# Patient Record
Sex: MALE | Race: WHITE
[De-identification: names, ages, dates, MRNs, and addresses within clinical notes are randomized per-mention and may not be internally consistent; named-entity substitution may affect disease eponyms.]

---

## 2018-06-30 ENCOUNTER — HOSPITAL ENCOUNTER (EMERGENCY)
Dept: HOSPITAL 42 - ED | Age: 23
Discharge: HOME | End: 2018-06-30
Payer: MEDICAID

## 2018-06-30 VITALS
HEART RATE: 92 BPM | OXYGEN SATURATION: 99 % | RESPIRATION RATE: 18 BRPM | SYSTOLIC BLOOD PRESSURE: 137 MMHG | DIASTOLIC BLOOD PRESSURE: 75 MMHG | TEMPERATURE: 98.1 F

## 2018-06-30 VITALS — BODY MASS INDEX: 21.2 KG/M2

## 2018-06-30 DIAGNOSIS — Z04.1: Primary | ICD-10-CM
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
## 2018-08-05 ENCOUNTER — HOSPITAL ENCOUNTER (EMERGENCY)
Dept: HOSPITAL 42 - ED | Age: 23
Discharge: HOME | End: 2018-08-05
Payer: MEDICAID

## 2018-08-05 VITALS — OXYGEN SATURATION: 100 % | TEMPERATURE: 98.3 F | RESPIRATION RATE: 18 BRPM

## 2018-08-05 VITALS — SYSTOLIC BLOOD PRESSURE: 127 MMHG | DIASTOLIC BLOOD PRESSURE: 88 MMHG | HEART RATE: 52 BPM

## 2018-08-05 VITALS — BODY MASS INDEX: 21.2 KG/M2

## 2018-08-05 DIAGNOSIS — R93.5: ICD-10-CM

## 2018-08-05 DIAGNOSIS — F17.210: ICD-10-CM

## 2018-08-05 DIAGNOSIS — R10.9: ICD-10-CM

## 2018-08-05 DIAGNOSIS — N20.0: Primary | ICD-10-CM

## 2018-08-05 LAB
ALBUMIN SERPL-MCNC: 4.7 G/DL (ref 3–4.8)
ALBUMIN/GLOB SERPL: 1.5 {RATIO} (ref 1.1–1.8)
ALT SERPL-CCNC: 28 U/L (ref 7–56)
APPEARANCE UR: CLEAR
AST SERPL-CCNC: 26 U/L (ref 17–59)
BACTERIA #/AREA URNS HPF: (no result) /[HPF]
BASOPHILS # BLD AUTO: 0.02 K/MM3 (ref 0–2)
BASOPHILS NFR BLD: 0.3 % (ref 0–3)
BILIRUB UR-MCNC: NEGATIVE MG/DL
BUN SERPL-MCNC: 20 MG/DL (ref 7–21)
CALCIUM SERPL-MCNC: 10.1 MG/DL (ref 8.4–10.5)
COLOR UR: YELLOW
EOSINOPHIL # BLD: 0.1 10*3/UL (ref 0–0.7)
EOSINOPHIL NFR BLD: 1.3 % (ref 1.5–5)
ERYTHROCYTE [DISTWIDTH] IN BLOOD BY AUTOMATED COUNT: 12.5 % (ref 11.5–14.5)
GFR NON-AFRICAN AMERICAN: > 60
GLUCOSE UR STRIP-MCNC: NEGATIVE MG/DL
GRANULOCYTES # BLD: 4.03 10*3/UL (ref 1.4–6.5)
GRANULOCYTES NFR BLD: 50.7 % (ref 50–68)
HGB BLD-MCNC: 16.1 G/DL (ref 14–18)
LEUKOCYTE ESTERASE UR-ACNC: NEGATIVE LEU/UL
LIPASE SERPL-CCNC: 44 U/L (ref 23–300)
LYMPHOCYTES # BLD: 3.1 10*3/UL (ref 1.2–3.4)
LYMPHOCYTES NFR BLD AUTO: 39.4 % (ref 22–35)
MCH RBC QN AUTO: 32.3 PG (ref 25–35)
MCHC RBC AUTO-ENTMCNC: 34.8 G/DL (ref 31–37)
MCV RBC AUTO: 93 FL (ref 80–105)
MONOCYTES # BLD AUTO: 0.7 10*3/UL (ref 0.1–0.6)
MONOCYTES NFR BLD: 8.3 % (ref 1–6)
PH UR STRIP: 6 [PH] (ref 4.7–8)
PLATELET # BLD: 189 10^3/UL (ref 120–450)
PMV BLD AUTO: 10.1 FL (ref 7–11)
PROT UR STRIP-MCNC: (no result) MG/DL
RBC # BLD AUTO: 4.98 10^6/UL (ref 3.5–6.1)
RBC # UR STRIP: (no result) /UL
RBC #/AREA URNS HPF: (no result) /HPF (ref 0–2)
SP GR UR STRIP: 1.02 (ref 1–1.03)
UROBILINOGEN UR STRIP-ACNC: 0.2 E.U./DL
WBC # BLD AUTO: 7.9 10^3/UL (ref 4.5–11)
WBC #/AREA URNS HPF: (no result) /HPF (ref 0–6)

## 2018-08-05 PROCEDURE — 83735 ASSAY OF MAGNESIUM: CPT

## 2018-08-05 PROCEDURE — 74176 CT ABD & PELVIS W/O CONTRAST: CPT

## 2018-08-05 PROCEDURE — 81001 URINALYSIS AUTO W/SCOPE: CPT

## 2018-08-05 PROCEDURE — 99285 EMERGENCY DEPT VISIT HI MDM: CPT

## 2018-08-05 PROCEDURE — 85025 COMPLETE CBC W/AUTO DIFF WBC: CPT

## 2018-08-05 PROCEDURE — 80053 COMPREHEN METABOLIC PANEL: CPT

## 2018-08-05 PROCEDURE — 96375 TX/PRO/DX INJ NEW DRUG ADDON: CPT

## 2018-08-05 PROCEDURE — 83690 ASSAY OF LIPASE: CPT

## 2018-08-05 PROCEDURE — 96374 THER/PROPH/DIAG INJ IV PUSH: CPT

## 2018-08-05 PROCEDURE — 96361 HYDRATE IV INFUSION ADD-ON: CPT

## 2018-08-05 NOTE — CT
Date of service: 



08/05/2018



PROCEDURE:  CT Abdomen and Pelvis without intravenous contrast



HISTORY:

acute left sided abdominal pain, . cva tenderne



COMPARISON:

None.



TECHNIQUE:

Without contrast. 



Contrast dose: 



Radiation dose:



Total exam DLP = 456 mGy-cm.



This CT exam was performed using one or more of the following dose 

reduction techniques: Automated exposure control, adjustment of the 

mA and/or kV according to patient size, and/or use of iterative 

reconstruction technique.



FINDINGS:



LOWER THORAX:

Unremarkable. 



LIVER:

Unremarkable. No gross lesion or ductal dilatation.  



GALLBLADDER AND BILE DUCTS:

Unremarkable. 



PANCREAS:

Unremarkable. No gross lesion or ductal dilatation.



SPLEEN:

Unremarkable. 



ADRENALS:

Unremarkable. No mass. 



KIDNEYS AND URETERS:

Unremarkable. No hydronephrosis. No solid mass. 2 mm nonobstructing 

stone in the left kidney 



VASCULATURE:

Unremarkable. No aortic aneurysm. 



BOWEL:

There is localized mural thickening at the junction of the descending 

and sigmoid colon.  The finding is most consistent with colitis. 

There is no significant mesenteric inflammation.  There is no abscess 

or extraluminal air



APPENDIX:

Unremarkable. Normal appendix. 



PERITONEUM:

Unremarkable. No free fluid. No free air. 



LYMPH NODES:

Unremarkable. No enlarged lymph nodes. 



BLADDER:

Unremarkable. 



REPRODUCTIVE:

Unremarkable. 



BONES:

No acute fracture. 



OTHER FINDINGS:

None.



IMPRESSION:

There is localized mural thickening at the junction of the descending 

and sigmoid colon.  The finding is most consistent with colitis. 

There is no significant mesenteric inflammation.  There is no abscess 

or extraluminal air

## 2018-08-05 NOTE — ED PDOC
Arrival/HPI





- General


Chief Complaint: Abdominal Pain


Time Seen by Provider: 08/05/18 09:10


Historian: Patient





- History of Present Illness


Narrative History of Present Illness (Text): 





08/05/18 09:10


21 y/o male, no significant pmh, nkda, biba c/o lt. lower quadrant abdominal 

pain x 1 hour with no fall or trauma. Pt. stated that he woke up this morning 

about 1 hour ago to urinate, suddenly has lt. sided abdominal pain, associated 

with nausea and vomiting, no fever or chills, no night sweat, no numbness or 

tingling, no penile discharge or rectal pain, no lower back pain, no urinary or 

bowel incontinence or retention, no other medical or psychological complaints. 





Past Medical History





- Provider Review


Nursing Documentation Reviewed: Yes





- Past History


Past History: No Previous





- Cardiac


Hx Cardiac Disorders: No





- Pulmonary


Hx Respiratory Disorders: No





- Neurological


Hx Neurological Disorder: No





- HEENT


Hx HEENT Disorder: No





- Renal


Hx Renal Disorder: No





- Endocrine/Metabolic


Hx Endocrine Disorders: No





- Hematological/Oncological


Hx Blood Disorders: No





- Integumentary


Hx Dermatological Disorder: No





- Musculoskeletal/Rheumatological


Hx Back Pain: Yes





- Gastrointestinal


Hx Gastrointestinal Disorders: No





- Genitourinary/Gynecological


Hx Genitourinary Disorders: No





- Psychiatric


Hx Depression: No


Hx Emotional Abuse: No


Hx Physical Abuse: No


Hx Substance Use: No





- Past Surgical History


Past Surgical History: Non-Contributing





- Anesthesia


Hx Anesthesia: No





- Suicidal Assessment


Feels Threatened In Home Enviroment: No





Family/Social History





- Physician Review


Nursing Documentation Reviewed: Yes


Family/Social History: Unknown Family HX


Smoking Status: Light Smoker < 10 Cigarettes Daily


Hx Alcohol Use: Yes


Hx Substance Use: No


Hx Substance Use Treatment: No





Allergies/Home Meds


Allergies/Adverse Reactions: 


Allergies





No Known Allergies Allergy (Verified 08/05/18 09:07)


 











Review of Systems





- Review of Systems


Constitutional: absent: Fatigue, Fevers


Eyes: absent: Vision Changes


ENT: absent: Hearing Changes


Respiratory: absent: SOB, Cough


Cardiovascular: absent: Chest Pain


Gastrointestinal: Abdominal Pain, Nausea, Vomiting.  absent: Diarrhea


Skin: absent: Rash, Pruritis


Neurological: absent: Headache, Dizziness


Psychiatric: absent: Anxiety, Depression, Suicidal Ideation





Physical Exam


Vital Signs Reviewed: Yes


Vital Signs











  Temp Pulse Resp BP Pulse Ox


 


 08/05/18 09:06  98.3 F  73  18  142/111 H  100











Temperature: Afebrile


Blood Pressure: Hypertensive


Pulse: Regular


Respiratory Rate: Normal


Appearance: Positive for: Uncomfortable


Pain Distress: Severe


Mental Status: Positive for: Alert and Oriented X 3





- Systems Exam


Head: Present: Atraumatic, Normocephalic


Pupils: Present: PERRL


Extroacular Muscles: Present: EOMI


Conjunctiva: Present: Normal


Mouth: Present: Moist Mucous Membranes


Neck: Present: Normal Range of Motion


Respiratory/Chest: Present: Clear to Auscultation, Good Air Exchange.  No: 

Respiratory Distress, Accessory Muscle Use


Cardiovascular: Present: Regular Rate and Rhythm, Normal S1, S2.  No: Murmurs


Abdomen: No: Tenderness, Distention, Peritoneal Signs, Rebound, Guarding


Back: Present: Normal Inspection, CVA Tenderness (left).  No: Midline Tenderness

, Paraspinal Tenderness, Pain with Leg Raise, Decubitus Ulcer


Upper Extremity: Present: Normal Inspection.  No: Cyanosis, Edema


Lower Extremity: Present: Normal Inspection.  No: Edema


Neurological: Present: GCS=15, CN II-XII Intact, Speech Normal, Motor Func 

Grossly Intact, Gait Normal, Memory Normal


Skin: Present: Warm, Dry, Normal Color.  No: Rashes


Psychiatric: Present: Alert, Oriented x 3, Normal Insight, Normal Concentration





Medical Decision Making


ED Course and Treatment: 





08/05/18 09:17


Differential: Obstructive renal stone vs. Pylonephritis vs. Pancreatitis vs. 

electrolyte imbalance


-Labs/ua


-CT abdomen and pelvis


-IVF/toradol/zofran


-Observe and reassess





08/05/18 11:34


-Labs are non-significant, lipase within normal limit, no elevation of wbc


-UA show +hematuria but no UTI. 


-CT abdomen and pelvis show: There is localized mural thickening at the 

junction of the descending and sigmoid colon.  The finding is most consistent 

with colitis. There is no significant mesenteric inflammation.  There is no 

abscess or extraluminal air.  2 mm nonobstructing stone in the left kidney 


-Pt. provided the urine in the ER, there is visible stone approx. 2mm elongated 

shape in the urine cup, after urinating, asymptomatic now. 


-Pt. feels asymptomatic at this time, eating and drinking well, vitally stable. 


-I explained all labs/radiology result with the patient, stated that my favor 

of the kidney stone is favor over the colitis as he has no diarrhea and no 

abdominal pain now, still there is possibility for colitis so I advised him 

that he shouldn't take any antibiotic until he has return abdominal pain/

diarrhea, pt. verbally expressed understanding.


-I explained all side effects of the antibiotics and other medications 

prescribed this time including prolong QT and achilles tendon rupture so he 

should avoid all gym/exercise while taking ciprofloxacin, pt. verbally 

expressed understanding.


-Discharge home with motrin, flomax, ciprofloxacin and flagyl (take it only if 

the lt. sided abdominal pain/diarrhea), bed rest, stay hydrated, BRAT diet, 

follow up with your own pmd and GI/Urologist within 2 days, return to the ER 

for any new or worsening signs or symptoms. 





- Lab Interpretations


Lab Results: 








 08/05/18 09:30 





 08/05/18 09:30 





 Lab Results





08/05/18 10:30: Urine Color Yellow, Urine Appearance Clear, Urine pH 6.0, Ur 

Specific Gravity 1.025, Urine Protein Trace H, Urine Glucose (UA) Negative, 

Urine Ketones Negative, Urine Blood Large H, Urine Nitrate Negative, Urine 

Bilirubin Negative, Urine Urobilinogen 0.2, Ur Leukocyte Esterase Negative, 

Urine RBC Tntc, Urine WBC 0 - 2, Urine Bacteria Few


08/05/18 09:30: WBC 7.9, RBC 4.98, Hgb 16.1, Hct 46.3, MCV 93.0, MCH 32.3, MCHC 

34.8, RDW 12.5, Plt Count 189, MPV 10.1, Gran % 50.7, Lymph % (Auto) 39.4 H, 

Mono % (Auto) 8.3 H, Eos % (Auto) 1.3 L, Baso % (Auto) 0.3, Gran # 4.03, Lymph 

# (Auto) 3.1, Mono # (Auto) 0.7 H, Eos # (Auto) 0.1, Baso # (Auto) 0.02


08/05/18 09:30: Sodium 141, Potassium 4.3, Chloride 105, Carbon Dioxide 23, 

Anion Gap 17, BUN 20, Creatinine 1.1, Est GFR (African Amer) > 60, Est GFR (Non-

Af Amer) > 60, Random Glucose 116 H, Calcium 10.1, Magnesium 2.0, Total 

Bilirubin 1.1, AST 26, ALT 28, Alkaline Phosphatase 61, Total Protein 7.8, 

Albumin 4.7, Globulin 3.0, Albumin/Globulin Ratio 1.5, Lipase 44








I have reviewed the lab results: Yes





- RAD Interpretation


Radiology Orders: 








08/05/18 09:14


ABD & PELVIS W/O PO OR IV CONT [CT] Stat 








PROCEDURE:  CT Abdomen and Pelvis without intravenous contrast





HISTORY:


acute left sided abdominal pain, . cva tenderne





COMPARISON:


None.





TECHNIQUE:


Without contrast. 





Contrast dose: 





Radiation dose:





Total exam DLP = 456 mGy-cm.





This CT exam was performed using one or more of the following dose reduction 

techniques: Automated exposure control, adjustment of the mA and/or kV 

according to patient size, and/or use of iterative reconstruction technique.





FINDINGS:





LOWER THORAX:


Unremarkable. 





LIVER:


Unremarkable. No gross lesion or ductal dilatation.  





GALLBLADDER AND BILE DUCTS:


Unremarkable. 





PANCREAS:


Unremarkable. No gross lesion or ductal dilatation.





SPLEEN:


Unremarkable. 





ADRENALS:


Unremarkable. No mass. 





KIDNEYS AND URETERS:


Unremarkable. No hydronephrosis. No solid mass. 2 mm nonobstructing stone in 

the left kidney 





VASCULATURE:


Unremarkable. No aortic aneurysm. 





BOWEL:


There is localized mural thickening at the junction of the descending and 

sigmoid colon.  The finding is most consistent with colitis. There is no 

significant mesenteric inflammation.  There is no abscess or extraluminal air





APPENDIX:


Unremarkable. Normal appendix. 





PERITONEUM:


Unremarkable. No free fluid. No free air. 





LYMPH NODES:


Unremarkable. No enlarged lymph nodes. 





BLADDER:


Unremarkable. 





REPRODUCTIVE:


Unremarkable. 





BONES:


No acute fracture. 





OTHER FINDINGS:


None.





IMPRESSION:


There is localized mural thickening at the junction of the descending and 

sigmoid colon.  The finding is most consistent with colitis. There is no 

significant mesenteric inflammation.  There is no abscess or extraluminal air





: Radiologist





- Medication Orders


Current Medication Orders: 











Discontinued Medications





Sodium Chloride (Sodium Chloride 0.9%)  1,000 mls @ 999 mls/hr IV .Q1H1M STA


   Stop: 08/05/18 10:14


   Last Admin: 08/05/18 09:35  Dose: 999 mls/hr





eMAR Start Stop


 Document     08/05/18 09:35  GMD  (Rec: 08/05/18 09:35  GMD  [id number])


     Intravenous Solution


      Start Date                                 08/05/18


      Start Time                                 09:35


      End Date                                   08/05/18


      End time                                   10:36


      Total Infusion Time                        61





Ketorolac Tromethamine (Toradol)  30 mg IVP STAT STA


   Stop: 08/05/18 09:15


   Last Admin: 08/05/18 09:35  Dose: 30 mg





MAR Pain Assessment


 Document     08/05/18 09:35  GMD  (Rec: 08/05/18 09:35  GMD  [id number])


     Pain Reassessment


      Is this a pain reassessment?               No


IVP Administration


 Document     08/05/18 09:35  GMD  (Rec: 08/05/18 09:35  GMD  [id number])


     Charges for Administration


      # of IVP Administrations                   1





Ondansetron HCl (Zofran Inj)  4 mg IVP STAT STA


   Stop: 08/05/18 09:15


   Last Admin: 08/05/18 09:35  Dose: 4 mg





IVP Administration


 Document     08/05/18 09:35  GMD  (Rec: 08/05/18 09:35  GMD  [id number])


     Charges for Administration


      # of IVP Administrations                   1














- PA / NP / Resident Statement


MD/ has reviewed & agrees with the documentation as recorded.





Disposition/Present on Arrival





- Present on Arrival


Any Indicators Present on Arrival: No


History of DVT/PE: No


History of Uncontrolled Diabetes: No


Urinary Catheter: No


History of Decub. Ulcer: No


History Surgical Site Infection Following: None





- Disposition


Have Diagnosis and Disposition been Completed?: Yes


Diagnosis: 


 Kidney stone, Abdominal pain, Abnormal abdominal CT scan





Disposition: HOME/ ROUTINE


Disposition Time: 11:36


Patient Plan: Discharge


Condition: IMPROVED


Additional Instructions: 


-Discharge home with motrin, flomax, ciprofloxacin and flagyl (take it only if 

the lt. sided abdominal pain/diarrhea), bed rest, stay hydrated, BRAT diet, 

follow up with your own pmd and GI/Urologist within 2 days, return to the ER 

for any new or worsening signs or symptoms. 


Prescriptions: 


Ciprofloxacin [Cipro] 500 mg PO BID #14 tab


Ibuprofen [Motrin Tab] 600 mg PO QID PRN #30 tab


 PRN Reason: Other


metroNIDAZOLE [Flagyl] 500 mg PO TID #21 tab


Tamsulosin [Flomax] 0.4 mg PO DAILY #7 cap


Referrals: 


Narda Noguera MD [Medical Doctor] - Follow up with primary


Johnathan Ortega MD [Staff Provider] - Follow up with primary


[facility] [place] [Outside] - Follow up with primary


Forms:  [employer] (English), WORK NOTE